# Patient Record
Sex: MALE | Race: WHITE | NOT HISPANIC OR LATINO | URBAN - METROPOLITAN AREA
[De-identification: names, ages, dates, MRNs, and addresses within clinical notes are randomized per-mention and may not be internally consistent; named-entity substitution may affect disease eponyms.]

---

## 2023-03-28 ENCOUNTER — EMERGENCY (EMERGENCY)
Facility: HOSPITAL | Age: 23
LOS: 1 days | Discharge: HOME | End: 2023-03-28
Admitting: EMERGENCY MEDICINE
Payer: COMMERCIAL

## 2023-03-28 VITALS
HEIGHT: 73 IN | WEIGHT: 175.05 LBS | TEMPERATURE: 98 F | DIASTOLIC BLOOD PRESSURE: 85 MMHG | SYSTOLIC BLOOD PRESSURE: 135 MMHG | OXYGEN SATURATION: 96 % | HEART RATE: 60 BPM | RESPIRATION RATE: 16 BRPM

## 2023-03-28 PROCEDURE — 99284 EMERGENCY DEPT VISIT MOD MDM: CPT

## 2023-03-28 PROCEDURE — 76870 US EXAM SCROTUM: CPT | Mod: 26

## 2023-03-28 NOTE — ED ADULT TRIAGE NOTE - CHIEF COMPLAINT QUOTE
Pt., walked in referred from [facility] MD for RT testicle and RLQ tenderness, Reported has cough x 1 wk and in the last 2 days noticed increasing pain when he coughs only to RT testicle. r.o testicular torsion. Pt., walked in referred from [facility] for RT testicle and RLQ tenderness, Reported has cough x 1 wk and in the last 2 days noticed increasing pain when he coughs only to RT testicle. r.o testicular torsion. JACKI Whitaker made aware.

## 2023-03-28 NOTE — ED ADULT NURSE NOTE - OBJECTIVE STATEMENT
21 y/o M here with right testicular pain. Patient states it started yesterday and was mild pain with cough. Patient states pain increased today and brother had torsion a couple months ago. Patient states he has been taking theraflu and an Jenna medicine the within the last day but medication is for cough. NKA. Patient is able to ambulate independently. A&Ox3.

## 2023-03-28 NOTE — ED ADULT NURSE NOTE - BREATHING, MLM
Spontaneous, unlabored and symmetrical FREE:[LAST:[aJyce],FIRST:[Joann],PHONE:[phone]],FAX:[phone]   [phone]    -],ADDRESS:[[address] [place] [address]],FOLLOWUP:[1-3 days]]

## 2023-03-28 NOTE — ED ADULT NURSE NOTE - CHIEF COMPLAINT QUOTE
Pt., walked in referred from [facility] for RT testicle and RLQ tenderness, Reported has cough x 1 wk and in the last 2 days noticed increasing pain when he coughs only to RT testicle. r.o testicular torsion. JACKI Whitaker made aware.

## 2023-03-29 LAB
APPEARANCE UR: CLEAR — SIGNIFICANT CHANGE UP
BILIRUB UR-MCNC: NEGATIVE — SIGNIFICANT CHANGE UP
C TRACH RRNA SPEC QL NAA+PROBE: DETECTED
COLOR SPEC: YELLOW — SIGNIFICANT CHANGE UP
DIFF PNL FLD: NEGATIVE — SIGNIFICANT CHANGE UP
GLUCOSE UR QL: NEGATIVE — SIGNIFICANT CHANGE UP
KETONES UR-MCNC: NEGATIVE — SIGNIFICANT CHANGE UP
LEUKOCYTE ESTERASE UR-ACNC: NEGATIVE — SIGNIFICANT CHANGE UP
N GONORRHOEA RRNA SPEC QL NAA+PROBE: SIGNIFICANT CHANGE UP
NITRITE UR-MCNC: NEGATIVE — SIGNIFICANT CHANGE UP
PH UR: 6 — SIGNIFICANT CHANGE UP (ref 5–8)
PROT UR-MCNC: NEGATIVE MG/DL — SIGNIFICANT CHANGE UP
SP GR SPEC: 1.02 — SIGNIFICANT CHANGE UP (ref 1–1.03)
SPECIMEN SOURCE: SIGNIFICANT CHANGE UP
UROBILINOGEN FLD QL: 0.2 E.U./DL — SIGNIFICANT CHANGE UP

## 2023-03-29 RX ORDER — CEFTRIAXONE 500 MG/1
500 INJECTION, POWDER, FOR SOLUTION INTRAMUSCULAR; INTRAVENOUS ONCE
Refills: 0 | Status: COMPLETED | OUTPATIENT
Start: 2023-03-29 | End: 2023-03-29

## 2023-03-29 RX ADMIN — CEFTRIAXONE 500 MILLIGRAM(S): 500 INJECTION, POWDER, FOR SOLUTION INTRAMUSCULAR; INTRAVENOUS at 01:58

## 2023-03-29 RX ADMIN — Medication 100 MILLIGRAM(S): at 01:58

## 2023-03-29 NOTE — ED PROVIDER NOTE - OBJECTIVE STATEMENT
21 yo m pw gradual onset of R testicular pain aching mod in severity worse with lifting heavy objects w/o any associated urinary symptoms, no n/v. No penile discharge.    I have reviewed available current nursing and previous documentation of past medical, surgical, family, and/or social history.

## 2023-03-29 NOTE — ED PROVIDER NOTE - CLINICAL SUMMARY MEDICAL DECISION MAKING FREE TEXT BOX
well appearing pt here with testicular pain with normal  x1 d, epidimiyo-orchitis vs varicocele will r/o torsion with US, and obtain UA, GC/Chlamydia

## 2023-03-29 NOTE — ED PROVIDER NOTE - PHYSICAL EXAMINATION
Physical Exam    Vital Signs: I have reviewed the initial vital signs.  Constitutional: well-appearing, appears stated age  Cardiovascular: +S1/S2, no murmurs, regular rate, regular rhythm, well-perfused extremities  Respiratory: unlabored respiratory effort, clear to auscultation bilaterally, speaks in full sentences  Gastrointestinal: soft, non-tender abdomen, no pulsatile mass  : b/l nl testes, nl scrotum, no lesions, no penile discharge, nontender exam.

## 2023-03-29 NOTE — ED PROVIDER NOTE - PROGRESS NOTE DETAILS
pt aware of results epididymo-orchitis treated with IM ceftriaxone and 10 do f doxycyline  by paper script as per [facility] recs for <36 yo

## 2023-03-29 NOTE — ED PROVIDER NOTE - PATIENT PORTAL LINK FT
You can access the FollowMyHealth Patient Portal offered by [facility] by registering at the following website: [email or web address]. By joining [employer]’s FollowMyHealth portal, you will also be able to view your health information using other applications (apps) compatible with our system.

## 2023-03-29 NOTE — ED PROVIDER NOTE - NSFOLLOWUPINSTRUCTIONS_ED_ALL_ED_FT
Epididymitis    Epididymitis is swelling (inflammation) of the epididymis. The epididymis is a cord-like structure that is located along the top and back part of the testicle. It collects and stores sperm from the testicle.     This condition can also cause pain and swelling of the testicle and scrotum. Symptoms usually start suddenly (acute epididymitis). Sometimes epididymitis starts gradually and lasts for a while (chronic epididymitis). This type may be harder to treat.    CAUSES  In men 35 and younger, this condition is usually caused by a bacterial infection or sexually transmitted disease (STD), such as:    Gonorrhea.   Chlamydia.      In men 35 and older who do not have anal sex, this condition is usually caused by bacteria from a blockage or abnormalities in the urinary system. These can result from:    Having a tube placed into the bladder (urinary catheter).  Having an enlarged or inflamed prostate gland.  Having recent urinary tract surgery.    In men who have a condition that weakens the body's defense system (immune system), such as HIV, this condition can be caused by:     Other bacteria, including tuberculosis and syphilis.  Viruses.  Fungi.    Sometimes this condition occurs without infection. That may happen if urine flows backward into the epididymis after heavy lifting or straining.    RISK FACTORS  This condition is more likely to develop in men:    Who have unprotected sex with more than one partner.  Who have anal sex.    Who have recently had surgery.    Who have a urinary catheter.   Who have urinary problems.  Who have a suppressed immune system.      SYMPTOMS  This condition usually begins suddenly with chills, fever, and pain behind the scrotum and in the testicle. Other symptoms include:     Swelling of the scrotum, testicle, or both.  Pain when ejaculating or urinating.  Pain in the back or belly.  Nausea.  Itching and discharge from the penis.  Frequent need to pass urine.  Redness and tenderness of the scrotum.    DIAGNOSIS  Your health care provider can diagnose this condition based on your symptoms and medical history. Your health care provider will also do a physical exam to ask about your symptoms and check your scrotum and testicle for swelling, pain, and redness. You may also have other tests, including:      Examination of discharge from the penis.   Urine tests for infections, such as STDs.      Your health care provider may test you for other STDs, including HIV.     TREATMENT  Treatment for this condition depends on the cause. If your condition is caused by a bacterial infection, oral antibiotic medicine may be prescribed. If the bacterial infection has spread to your blood, you may need to receive IV antibiotics. Nonbacterial epididymitis is treated with home care that includes bed rest and elevation of the scrotum.    Surgery may be needed to treat:    Bacterial epididymitis that causes pus to build up in the scrotum (abscess).  Chronic epididymitis that has not responded to other treatments.     HOME CARE INSTRUCTIONS  Medicines     Take over-the-counter and prescription medicines only as told by your health care provider.    If you were prescribed an antibiotic medicine, take it as told by your health care provider. Do not stop taking the antibiotic even if your condition improves.     Sexual Activity     If your epididymitis was caused by an STD, avoid sexual activity until your treatment is complete.  Inform your sexual partner or partners if you test positive for an STD. They may need to be treated. Do not engage in sexual activity with your partner or partners until their treatment is completed.    General Instructions     Return to your normal activities as told by your health care provider. Ask your health care provider what activities are safe for you.  Keep your scrotum elevated and supported while resting. Ask your health care provider if you should wear a scrotal support, such as a jockstrap. Wear it as told by your health care provider.  If directed, apply ice to the affected area:    Put ice in a plastic bag.  Place a towel between your skin and the bag.  Leave the ice on for 20 minutes, 2–3 times per day.  Try taking a sitz bath to help with discomfort. This is a warm water bath that is taken while you are sitting down. The water should only come up to your hips and should cover your buttocks. Do this 3–4 times per day or as told by your health care provider.  Keep all follow-up visits as told by your health care provider. This is important.    SEEK MEDICAL CARE IF:  You have a fever.    Your pain medicine is not helping.    Your pain is getting worse.    Your symptoms do not improve within three days.     ADDITIONAL NOTES AND INSTRUCTIONS    Please follow up with your Primary MD in 24-48 hr.  Seek immediate medical care for any new/worsening signs or symptoms.

## 2023-03-30 LAB
CULTURE RESULTS: SIGNIFICANT CHANGE UP
SPECIMEN SOURCE: SIGNIFICANT CHANGE UP

## 2023-03-30 NOTE — ED POST DISCHARGE NOTE - OTHER COMMUNICATION
4/3 Patient called back. Result given. 4/3 Patient called back. Result given. 5/2 Patient called again to see if he should get retested. I recommended he get a test of cure.

## 2023-03-31 DIAGNOSIS — N50.811 RIGHT TESTICULAR PAIN: ICD-10-CM

## 2023-03-31 DIAGNOSIS — N45.1 EPIDIDYMITIS: ICD-10-CM
